# Patient Record
Sex: MALE | Employment: OTHER | URBAN - NONMETROPOLITAN AREA
[De-identification: names, ages, dates, MRNs, and addresses within clinical notes are randomized per-mention and may not be internally consistent; named-entity substitution may affect disease eponyms.]

---

## 2017-03-30 LAB
BASOPHILS ABSOLUTE: 0.1 10'3/UL (ref 0–0.2)
BASOPHILS RELATIVE PERCENT: 1.2 % (ref 0.2–2)
EOSINOPHILS ABSOLUTE: 0.4 10'3/UL (ref 0–0.4)
EOSINOPHILS RELATIVE PERCENT: 4.4 % (ref 0–6.3)
HCT VFR BLD CALC: 45.1 % (ref 39.8–49.4)
HEMOGLOBIN: 15.1 G/DL (ref 13.5–16.8)
LYMPHOCYTES ABSOLUTE: 1.8 10'3/UL (ref 0.4–3.6)
LYMPHOCYTES RELATIVE PERCENT: 20.3 % (ref 13.3–42.4)
MCH RBC QN AUTO: 29.8 PG (ref 27.9–33.7)
MCHC RBC AUTO-ENTMCNC: 33.6 G/DL (ref 33–35.2)
MCV RBC AUTO: 88.7 FL (ref 83.9–97)
MONOCYTES ABSOLUTE: 1 10'3/UL (ref 0.3–1)
MONOCYTES RELATIVE PERCENT: 11.4 % (ref 5.2–13.6)
NEUTROPHILS ABSOLUTE: 5.6 10'3/UL (ref 2.2–6.3)
NEUTROPHILS SEGMENTED: 62.7 % (ref 43.5–74.5)
PDW BLD-RTO: 13.6 % (ref 11.7–15.5)
PLATELET # BLD: 197 10'3/UL (ref 144–327)
PMV BLD AUTO: 8.4 FL (ref 7.2–10.4)
RBC # BLD: 5.08 10'6/UL (ref 4.24–5.64)
WBC: 8.9 10'3/UL (ref 4.1–10.2)

## 2017-04-20 LAB
BUN BLDV-MCNC: 23 MG/DL (ref 7–18)
CREAT SERPL-MCNC: 1.22 MG/DL (ref 0.7–1.3)

## 2017-04-27 LAB
BASOPHILS ABSOLUTE: 0.1 10'3/UL (ref 0–0.2)
BASOPHILS RELATIVE PERCENT: 1.2 % (ref 0.2–2)
EOSINOPHILS ABSOLUTE: 0.3 10'3/UL (ref 0–0.4)
EOSINOPHILS RELATIVE PERCENT: 4.8 % (ref 0–6.3)
HCT VFR BLD CALC: 43.6 % (ref 39.8–49.4)
HEMOGLOBIN: 14.8 G/DL (ref 13.5–16.8)
LYMPHOCYTES ABSOLUTE: 1.7 10'3/UL (ref 0.4–3.6)
LYMPHOCYTES RELATIVE PERCENT: 24.9 % (ref 13.3–42.4)
MCH RBC QN AUTO: 30.1 PG (ref 27.9–33.7)
MCHC RBC AUTO-ENTMCNC: 33.9 G/DL (ref 33–35.2)
MCV RBC AUTO: 88.9 FL (ref 83.9–97)
MONOCYTES ABSOLUTE: 0.7 10'3/UL (ref 0.3–1)
MONOCYTES RELATIVE PERCENT: 9.8 % (ref 5.2–13.6)
NEUTROPHILS ABSOLUTE: 4 10'3/UL (ref 2.2–6.3)
NEUTROPHILS SEGMENTED: 59.3 % (ref 43.5–74.5)
PDW BLD-RTO: 13.5 % (ref 11.7–15.5)
PLATELET # BLD: 190 10'3/UL (ref 144–327)
PMV BLD AUTO: 8.2 FL (ref 7.2–10.4)
RBC # BLD: 4.9 10'6/UL (ref 4.24–5.64)
WBC: 6.8 10'3/UL (ref 4.1–10.2)

## 2017-06-19 LAB
BASOPHILS ABSOLUTE: 0.1 10'3/UL (ref 0–0.2)
BASOPHILS RELATIVE PERCENT: 1 % (ref 0.2–2)
EOSINOPHILS ABSOLUTE: 0.3 10'3/UL (ref 0–0.4)
EOSINOPHILS RELATIVE PERCENT: 3.5 % (ref 0–6.3)
HCT VFR BLD CALC: 45.7 % (ref 39.8–49.4)
HEMOGLOBIN: 15.3 G/DL (ref 13.5–16.8)
LYMPHOCYTES ABSOLUTE: 2.1 10'3/UL (ref 0.4–3.6)
LYMPHOCYTES RELATIVE PERCENT: 22.8 % (ref 13.3–42.4)
MCH RBC QN AUTO: 30.2 PG (ref 27.9–33.7)
MCHC RBC AUTO-ENTMCNC: 33.5 G/DL (ref 33–35.2)
MCV RBC AUTO: 90.2 FL (ref 83.9–97)
MONOCYTES ABSOLUTE: 0.9 10'3/UL (ref 0.3–1)
MONOCYTES RELATIVE PERCENT: 10.2 % (ref 5.2–13.6)
NEUTROPHILS ABSOLUTE: 5.7 10'3/UL (ref 2.2–6.3)
NEUTROPHILS SEGMENTED: 62.5 % (ref 43.5–74.5)
PDW BLD-RTO: 13 % (ref 11.7–15.5)
PLATELET # BLD: 205 10'3/UL (ref 144–327)
PMV BLD AUTO: 8.8 FL (ref 7.2–10.4)
RBC # BLD: 5.06 10'6/UL (ref 4.24–5.64)
WBC: 9.1 10'3/UL (ref 4.1–10.2)

## 2017-07-27 LAB
ALBUMIN: 3.2 G/DL (ref 3.5–5)
ALP BLD-CCNC: 90 U/L (ref 45–117)
ALT SERPL-CCNC: 13 U/L (ref 12–78)
AST SERPL-CCNC: 26 U/L (ref 15–37)
BASOPHILS ABSOLUTE: 0.1 10'3/UL (ref 0–0.2)
BASOPHILS RELATIVE PERCENT: 0.9 % (ref 0.2–2)
BILIRUB SERPL-MCNC: 0.6 MG/DL (ref 0–1)
BUN BLDV-MCNC: 26 MG/DL (ref 7–18)
CALCIUM SERPL-MCNC: 8.6 MG/DL (ref 8.5–10.1)
CHLORIDE BLD-SCNC: 105 MMOL/L (ref 97–107)
CO2: 28 MMOL/L (ref 21–32)
CREAT SERPL-MCNC: 1.15 MG/DL (ref 0.7–1.3)
EOSINOPHILS ABSOLUTE: 0.3 10'3/UL (ref 0–0.4)
EOSINOPHILS RELATIVE PERCENT: 3.1 % (ref 0–6.3)
GFR CALCULATED: > 60
GLUCOSE: 86 MG/DL (ref 70–99)
HCT VFR BLD CALC: 44.7 % (ref 39.8–49.4)
HEMOGLOBIN: 15 G/DL (ref 13.5–16.8)
LYMPHOCYTES ABSOLUTE: 1.7 10'3/UL (ref 0.4–3.6)
LYMPHOCYTES RELATIVE PERCENT: 20.7 % (ref 13.3–42.4)
MCH RBC QN AUTO: 30 PG (ref 27.9–33.7)
MCHC RBC AUTO-ENTMCNC: 33.5 G/DL (ref 33–35.2)
MCV RBC AUTO: 89.4 FL (ref 83.9–97)
MONOCYTES ABSOLUTE: 0.9 10'3/UL (ref 0.3–1)
MONOCYTES RELATIVE PERCENT: 10.6 % (ref 5.2–13.6)
NEUTROPHILS ABSOLUTE: 5.3 10'3/UL (ref 2.2–6.3)
NEUTROPHILS SEGMENTED: 64.7 % (ref 43.5–74.5)
PDW BLD-RTO: 12.9 % (ref 11.7–15.5)
PLATELET # BLD: 182 10'3/UL (ref 144–327)
PMV BLD AUTO: 8.3 FL (ref 7.2–10.4)
POTASSIUM SERPL-SCNC: 4.3 MMOL/L (ref 3.5–5.1)
RBC # BLD: 5 10'6/UL (ref 4.24–5.64)
SODIUM BLD-SCNC: 139 MMOL/L (ref 136–145)
TOTAL PROTEIN: 6.8 G/DL (ref 6.4–8.2)
WBC: 8.1 10'3/UL (ref 4.1–10.2)

## 2019-06-11 LAB
BASOPHILS ABSOLUTE: 0.1 10'3/UL (ref 0–0.2)
BASOPHILS RELATIVE PERCENT: 0.7 % (ref 0.2–2)
EOSINOPHILS ABSOLUTE: 0.5 10'3/UL (ref 0–0.4)
EOSINOPHILS RELATIVE PERCENT: 6.5 % (ref 0–6.3)
HCT VFR BLD CALC: 46.1 % (ref 39.8–49.4)
HEMOGLOBIN: 14.9 G/DL (ref 13.5–16.8)
LYMPHOCYTES ABSOLUTE: 1.9 10'3/UL (ref 0.4–3.6)
LYMPHOCYTES RELATIVE PERCENT: 24.9 % (ref 13.3–42.4)
MCH RBC QN AUTO: 30.1 PG (ref 27.9–33.7)
MCHC RBC AUTO-ENTMCNC: 32.3 G/DL (ref 33–35.2)
MCV RBC AUTO: 92.9 FL (ref 83.9–97)
MONOCYTES ABSOLUTE: 0.8 10'3/UL (ref 0.3–1)
MONOCYTES RELATIVE PERCENT: 10.8 % (ref 5.2–13.6)
NEUTROPHILS ABSOLUTE: 4.4 10'3/UL (ref 2.2–6.3)
NEUTROPHILS SEGMENTED: 57.1 % (ref 43.5–74.5)
PDW BLD-RTO: 13.6 % (ref 11.7–15.5)
PLATELET # BLD: 180 10'3/UL (ref 144–327)
PMV BLD AUTO: 9.9 FL (ref 7.2–10.4)
RBC # BLD: 4.96 10'6/UL (ref 4.24–5.64)
WBC: 7.7 10'3/UL (ref 4.1–10.2)

## 2019-10-08 DIAGNOSIS — M81.0 OSTEOPOROSIS, UNSPECIFIED OSTEOPOROSIS TYPE, UNSPECIFIED PATHOLOGICAL FRACTURE PRESENCE: ICD-10-CM

## 2019-10-08 DIAGNOSIS — I15.9 SECONDARY HYPERTENSION: ICD-10-CM

## 2019-10-08 RX ORDER — GALANTAMINE HYDROBROMIDE 8 MG/1
8 CAPSULE, EXTENDED RELEASE ORAL
COMMUNITY
End: 2019-10-23 | Stop reason: DRUGHIGH

## 2019-10-08 RX ORDER — CITALOPRAM 10 MG/1
10 TABLET ORAL DAILY
COMMUNITY
End: 2019-10-23 | Stop reason: DRUGHIGH

## 2019-10-08 RX ORDER — TAMSULOSIN HYDROCHLORIDE 0.4 MG/1
0.4 CAPSULE ORAL DAILY
COMMUNITY

## 2019-10-08 RX ORDER — LISINOPRIL 10 MG/1
10 TABLET ORAL DAILY
COMMUNITY
End: 2019-10-23 | Stop reason: DRUGHIGH

## 2019-10-08 RX ORDER — OMEPRAZOLE 10 MG/1
10 CAPSULE, DELAYED RELEASE ORAL DAILY
COMMUNITY
End: 2019-10-23 | Stop reason: DRUGHIGH

## 2019-10-08 RX ORDER — RISPERIDONE 0.25 MG/1
0.25 TABLET, FILM COATED ORAL 2 TIMES DAILY
COMMUNITY
End: 2019-10-23 | Stop reason: DRUGHIGH

## 2019-10-08 RX ORDER — METOCLOPRAMIDE 10 MG/1
10 TABLET ORAL 4 TIMES DAILY
COMMUNITY
End: 2019-10-23 | Stop reason: DRUGHIGH

## 2019-10-08 SDOH — HEALTH STABILITY: MENTAL HEALTH: HOW OFTEN DO YOU HAVE A DRINK CONTAINING ALCOHOL?: NEVER

## 2019-10-23 ENCOUNTER — OFFICE VISIT (OUTPATIENT)
Dept: PAIN MANAGEMENT | Age: 74
End: 2019-10-23
Payer: MEDICARE

## 2019-10-23 VITALS
DIASTOLIC BLOOD PRESSURE: 88 MMHG | BODY MASS INDEX: 35.21 KG/M2 | RESPIRATION RATE: 20 BRPM | HEIGHT: 72 IN | SYSTOLIC BLOOD PRESSURE: 138 MMHG | WEIGHT: 260 LBS

## 2019-10-23 DIAGNOSIS — M54.16 LUMBAR RADICULOPATHY: Primary | ICD-10-CM

## 2019-10-23 DIAGNOSIS — I10 ESSENTIAL HYPERTENSION: ICD-10-CM

## 2019-10-23 DIAGNOSIS — M47.817 LUMBOSACRAL SPONDYLOSIS WITHOUT MYELOPATHY: ICD-10-CM

## 2019-10-23 DIAGNOSIS — M19.90 ARTHRITIS: ICD-10-CM

## 2019-10-23 PROCEDURE — 3017F COLORECTAL CA SCREEN DOC REV: CPT | Performed by: PHYSICAL MEDICINE & REHABILITATION

## 2019-10-23 PROCEDURE — 1036F TOBACCO NON-USER: CPT | Performed by: PHYSICAL MEDICINE & REHABILITATION

## 2019-10-23 PROCEDURE — G8484 FLU IMMUNIZE NO ADMIN: HCPCS | Performed by: PHYSICAL MEDICINE & REHABILITATION

## 2019-10-23 PROCEDURE — 1123F ACP DISCUSS/DSCN MKR DOCD: CPT | Performed by: PHYSICAL MEDICINE & REHABILITATION

## 2019-10-23 PROCEDURE — G8427 DOCREV CUR MEDS BY ELIG CLIN: HCPCS | Performed by: PHYSICAL MEDICINE & REHABILITATION

## 2019-10-23 PROCEDURE — 4040F PNEUMOC VAC/ADMIN/RCVD: CPT | Performed by: PHYSICAL MEDICINE & REHABILITATION

## 2019-10-23 PROCEDURE — G8417 CALC BMI ABV UP PARAM F/U: HCPCS | Performed by: PHYSICAL MEDICINE & REHABILITATION

## 2019-10-23 PROCEDURE — 99204 OFFICE O/P NEW MOD 45 MIN: CPT | Performed by: PHYSICAL MEDICINE & REHABILITATION

## 2019-10-23 RX ORDER — GALANTAMINE HYDROBROMIDE 16 MG/1
16 CAPSULE, EXTENDED RELEASE ORAL DAILY
COMMUNITY
Start: 2019-01-04 | End: 2020-09-30 | Stop reason: DRUGHIGH

## 2019-10-23 RX ORDER — OXYBUTYNIN CHLORIDE 10 MG/1
10 TABLET, EXTENDED RELEASE ORAL DAILY
COMMUNITY
Start: 2019-10-02 | End: 2020-09-30 | Stop reason: DRUGHIGH

## 2019-10-23 RX ORDER — RISPERIDONE 1 MG/1
TABLET, FILM COATED ORAL 2 TIMES DAILY
COMMUNITY
Start: 2019-01-04

## 2019-10-23 RX ORDER — CITALOPRAM 20 MG/1
20 TABLET ORAL DAILY
COMMUNITY
Start: 2019-01-04

## 2019-10-23 RX ORDER — CLOZAPINE 25 MG/1
TABLET ORAL
COMMUNITY
Start: 2019-01-31

## 2019-10-23 RX ORDER — OMEPRAZOLE 40 MG/1
1 CAPSULE, DELAYED RELEASE ORAL DAILY
COMMUNITY

## 2019-10-23 RX ORDER — DOCUSATE SODIUM 100 MG/1
CAPSULE, LIQUID FILLED ORAL
COMMUNITY

## 2019-10-23 ASSESSMENT — ENCOUNTER SYMPTOMS
APNEA: 0
BACK PAIN: 0
COLOR CHANGE: 0

## 2019-10-24 ENCOUNTER — TELEPHONE (OUTPATIENT)
Dept: PAIN MANAGEMENT | Age: 74
End: 2019-10-24

## 2019-10-29 LAB
ALBUMIN: 3.5 G/DL (ref 3.2–4.6)
ALP BLD-CCNC: 96 U/L (ref 40–150)
ALT SERPL-CCNC: 9 U/L (ref 0–55)
ANION GAP SERPL CALCULATED.3IONS-SCNC: 12 MEQ/L (ref 5–13)
AST SERPL-CCNC: 21 U/L (ref 5–34)
BASOPHILS ABSOLUTE: 0 10'3/UL (ref 0–0.2)
BASOPHILS RELATIVE PERCENT: 0.7 % (ref 0.2–2)
BILIRUB SERPL-MCNC: 0.5 MG/DL (ref 0–1)
BUN BLDV-MCNC: 21 MG/DL (ref 7–18)
CALCIUM SERPL-MCNC: 8.7 MG/DL (ref 8.8–10)
CHLORIDE BLD-SCNC: 108 MMOL/L (ref 97–107)
CO2: 22 MMOL/L (ref 23–31)
CREAT SERPL-MCNC: 1.07 MG/DL (ref 0.72–1.25)
EOSINOPHILS ABSOLUTE: 0.5 10'3/UL (ref 0–0.4)
EOSINOPHILS RELATIVE PERCENT: 7 % (ref 0–6.3)
GFR CALCULATED: > 60
GLUCOSE: 97 MG/DL (ref 70–99)
HCT VFR BLD CALC: 43.7 % (ref 39.8–49.4)
HEMOGLOBIN: 14.9 G/DL (ref 13.5–16.8)
LYMPHOCYTES ABSOLUTE: 1.8 10'3/UL (ref 0.4–3.6)
LYMPHOCYTES RELATIVE PERCENT: 25.6 % (ref 13.3–42.4)
MCH RBC QN AUTO: 31 PG (ref 27.9–33.7)
MCHC RBC AUTO-ENTMCNC: 34.1 G/DL (ref 33–35.2)
MCV RBC AUTO: 91.1 FL (ref 83.9–97)
MONOCYTES ABSOLUTE: 0.8 10'3/UL (ref 0.3–1)
MONOCYTES RELATIVE PERCENT: 11.3 % (ref 5.2–13.6)
NEUTROPHILS ABSOLUTE: 3.8 10'3/UL (ref 2.2–6.3)
NEUTROPHILS SEGMENTED: 55.4 % (ref 43.5–74.5)
PDW BLD-RTO: 13.9 % (ref 11.7–15.5)
PLATELET # BLD: 169 10'3/UL (ref 144–327)
PMV BLD AUTO: 9.4 FL (ref 7.2–10.4)
POTASSIUM SERPL-SCNC: 4.4 MMOL/L (ref 3.5–5.1)
RBC # BLD: 4.8 10'6/UL (ref 4.24–5.64)
SODIUM BLD-SCNC: 142 MMOL/L (ref 136–145)
TOTAL PROTEIN: 6.6 G/DL (ref 6.4–8.2)
WBC: 6.9 10'3/UL (ref 4.1–10.2)

## 2019-11-06 ENCOUNTER — PROCEDURE VISIT (OUTPATIENT)
Dept: PAIN MANAGEMENT | Age: 74
End: 2019-11-06
Payer: MEDICARE

## 2019-11-06 DIAGNOSIS — M47.816 LUMBAR FACET ARTHROPATHY: ICD-10-CM

## 2019-11-06 DIAGNOSIS — M47.816 LUMBAR FACET JOINT SYNDROME: ICD-10-CM

## 2019-11-06 PROCEDURE — 64493 INJ PARAVERT F JNT L/S 1 LEV: CPT | Performed by: PHYSICAL MEDICINE & REHABILITATION

## 2019-11-06 PROCEDURE — 64494 INJ PARAVERT F JNT L/S 2 LEV: CPT | Performed by: PHYSICAL MEDICINE & REHABILITATION

## 2019-11-06 PROCEDURE — 64495 INJ PARAVERT F JNT L/S 3 LEV: CPT | Performed by: PHYSICAL MEDICINE & REHABILITATION

## 2019-11-07 ENCOUNTER — TELEPHONE (OUTPATIENT)
Dept: PAIN MANAGEMENT | Age: 74
End: 2019-11-07

## 2019-11-20 ENCOUNTER — OFFICE VISIT (OUTPATIENT)
Dept: PAIN MANAGEMENT | Age: 74
End: 2019-11-20
Payer: MEDICARE

## 2019-11-20 VITALS
HEART RATE: 72 BPM | SYSTOLIC BLOOD PRESSURE: 130 MMHG | WEIGHT: 270 LBS | HEIGHT: 72 IN | DIASTOLIC BLOOD PRESSURE: 70 MMHG | BODY MASS INDEX: 36.57 KG/M2 | RESPIRATION RATE: 16 BRPM

## 2019-11-20 DIAGNOSIS — G20 PARKINSON'S DISEASE (HCC): ICD-10-CM

## 2019-11-20 DIAGNOSIS — F32.0 CURRENT MILD EPISODE OF MAJOR DEPRESSIVE DISORDER WITHOUT PRIOR EPISODE (HCC): ICD-10-CM

## 2019-11-20 DIAGNOSIS — Z96.641 HISTORY OF RIGHT HIP REPLACEMENT: ICD-10-CM

## 2019-11-20 DIAGNOSIS — M47.816 LUMBAR FACET JOINT SYNDROME: Primary | ICD-10-CM

## 2019-11-20 DIAGNOSIS — M54.16 LUMBAR RADICULOPATHY: ICD-10-CM

## 2019-11-20 PROCEDURE — 3017F COLORECTAL CA SCREEN DOC REV: CPT | Performed by: PHYSICAL MEDICINE & REHABILITATION

## 2019-11-20 PROCEDURE — G8427 DOCREV CUR MEDS BY ELIG CLIN: HCPCS | Performed by: PHYSICAL MEDICINE & REHABILITATION

## 2019-11-20 PROCEDURE — 4040F PNEUMOC VAC/ADMIN/RCVD: CPT | Performed by: PHYSICAL MEDICINE & REHABILITATION

## 2019-11-20 PROCEDURE — 1123F ACP DISCUSS/DSCN MKR DOCD: CPT | Performed by: PHYSICAL MEDICINE & REHABILITATION

## 2019-11-20 PROCEDURE — G8484 FLU IMMUNIZE NO ADMIN: HCPCS | Performed by: PHYSICAL MEDICINE & REHABILITATION

## 2019-11-20 PROCEDURE — 1036F TOBACCO NON-USER: CPT | Performed by: PHYSICAL MEDICINE & REHABILITATION

## 2019-11-20 PROCEDURE — 99214 OFFICE O/P EST MOD 30 MIN: CPT | Performed by: PHYSICAL MEDICINE & REHABILITATION

## 2019-11-20 PROCEDURE — G8417 CALC BMI ABV UP PARAM F/U: HCPCS | Performed by: PHYSICAL MEDICINE & REHABILITATION

## 2019-11-20 ASSESSMENT — ENCOUNTER SYMPTOMS
APNEA: 0
COLOR CHANGE: 0
BACK PAIN: 0

## 2019-11-21 LAB
ALBUMIN: 3.6 G/DL (ref 3.2–4.6)
ALP BLD-CCNC: 96 U/L (ref 40–150)
ALT SERPL-CCNC: 12 U/L (ref 0–55)
ANION GAP SERPL CALCULATED.3IONS-SCNC: 12 MEQ/L (ref 5–13)
AST SERPL-CCNC: 19 U/L (ref 5–34)
BASOPHILS ABSOLUTE: 0.1 10'3/UL (ref 0–0.2)
BASOPHILS RELATIVE PERCENT: 1 % (ref 0.2–2)
BILIRUB SERPL-MCNC: 0.4 MG/DL (ref 0–1)
BUN BLDV-MCNC: 22 MG/DL (ref 7–18)
CALCIUM SERPL-MCNC: 8.8 MG/DL (ref 8.8–10)
CHLORIDE BLD-SCNC: 108 MMOL/L (ref 98–107)
CO2: 22 MMOL/L (ref 23–31)
CREAT SERPL-MCNC: 1.1 MG/DL (ref 0.72–1.25)
EOSINOPHILS ABSOLUTE: 0.5 10'3/UL (ref 0–0.4)
EOSINOPHILS RELATIVE PERCENT: 6.8 % (ref 0–6.3)
GFR CALCULATED: > 60
GLUCOSE: 106 MG/DL (ref 70–99)
HCT VFR BLD CALC: 43 % (ref 39.8–49.4)
HEMOGLOBIN: 14.8 G/DL (ref 13.5–16.8)
LYMPHOCYTES ABSOLUTE: 1.6 10'3/UL (ref 0.4–3.6)
LYMPHOCYTES RELATIVE PERCENT: 24 % (ref 13.3–42.4)
MCH RBC QN AUTO: 30.9 PG (ref 27.9–33.7)
MCHC RBC AUTO-ENTMCNC: 34.4 G/DL (ref 33–35.2)
MCV RBC AUTO: 89.9 FL (ref 83.9–97)
MONOCYTES ABSOLUTE: 0.6 10'3/UL (ref 0.3–1)
MONOCYTES RELATIVE PERCENT: 9.3 % (ref 5.2–13.6)
NEUTROPHILS ABSOLUTE: 4 10'3/UL (ref 2.2–6.3)
NEUTROPHILS SEGMENTED: 58.9 % (ref 43.5–74.5)
PDW BLD-RTO: 13.5 % (ref 11.7–15.5)
PLATELET # BLD: 172 10'3/UL (ref 144–327)
PMV BLD AUTO: 8.4 FL (ref 7.2–10.4)
POTASSIUM SERPL-SCNC: 4.5 MMOL/L (ref 3.5–4.5)
RBC # BLD: 4.78 10'6/UL (ref 4.24–5.64)
SODIUM BLD-SCNC: 142 MMOL/L (ref 136–145)
TOTAL PROTEIN: 6.9 G/DL (ref 6.4–8.2)
WBC: 6.8 10'3/UL (ref 4.1–10.2)

## 2019-11-27 ENCOUNTER — PROCEDURE VISIT (OUTPATIENT)
Dept: PAIN MANAGEMENT | Age: 74
End: 2019-11-27
Payer: MEDICARE

## 2019-11-27 DIAGNOSIS — M47.817 LUMBOSACRAL SPONDYLOSIS WITHOUT MYELOPATHY: ICD-10-CM

## 2019-11-27 DIAGNOSIS — M47.816 LUMBAR FACET JOINT SYNDROME: ICD-10-CM

## 2019-11-27 PROCEDURE — 64493 INJ PARAVERT F JNT L/S 1 LEV: CPT | Performed by: PHYSICAL MEDICINE & REHABILITATION

## 2019-11-27 PROCEDURE — 64495 INJ PARAVERT F JNT L/S 3 LEV: CPT | Performed by: PHYSICAL MEDICINE & REHABILITATION

## 2019-11-27 PROCEDURE — 64494 INJ PARAVERT F JNT L/S 2 LEV: CPT | Performed by: PHYSICAL MEDICINE & REHABILITATION

## 2019-12-04 ENCOUNTER — TELEPHONE (OUTPATIENT)
Dept: PAIN MANAGEMENT | Age: 74
End: 2019-12-04

## 2020-01-06 ENCOUNTER — TELEPHONE (OUTPATIENT)
Dept: PAIN MANAGEMENT | Age: 75
End: 2020-01-06

## 2020-01-06 NOTE — TELEPHONE ENCOUNTER
Please call pt to inform if he needs to hold blood thinners or not for procedure on Wednesday    [phone]

## 2020-01-07 LAB
ALBUMIN: 3.5 G/DL (ref 3.2–4.6)
ALP BLD-CCNC: 95 U/L (ref 40–150)
ALT SERPL-CCNC: 14 U/L (ref 0–55)
ANION GAP SERPL CALCULATED.3IONS-SCNC: 11 MEQ/L (ref 5–13)
AST SERPL-CCNC: 23 U/L (ref 5–34)
BASOPHILS ABSOLUTE: 0.1 10'3/UL (ref 0–0.2)
BASOPHILS RELATIVE PERCENT: 0.7 % (ref 0.2–2)
BILIRUB SERPL-MCNC: 0.5 MG/DL (ref 0–1)
BUN BLDV-MCNC: 24 MG/DL (ref 7–18)
CALCIUM SERPL-MCNC: 8.9 MG/DL (ref 8.8–10)
CHLORIDE BLD-SCNC: 108 MMOL/L (ref 98–107)
CO2: 22 MMOL/L (ref 23–31)
CREAT SERPL-MCNC: 1.27 MG/DL (ref 0.72–1.25)
EOSINOPHILS ABSOLUTE: 0.5 10'3/UL (ref 0–0.4)
EOSINOPHILS RELATIVE PERCENT: 6.7 % (ref 0–6.3)
GFR CALCULATED: 59
GLUCOSE: 94 MG/DL (ref 70–99)
HCT VFR BLD CALC: 44.1 % (ref 39.8–49.4)
HEMOGLOBIN: 15.2 G/DL (ref 13.5–16.8)
LYMPHOCYTES ABSOLUTE: 2 10'3/UL (ref 0.4–3.6)
LYMPHOCYTES RELATIVE PERCENT: 26.2 % (ref 13.3–42.4)
MCH RBC QN AUTO: 31.1 PG (ref 27.9–33.7)
MCHC RBC AUTO-ENTMCNC: 34.6 G/DL (ref 33–35.2)
MCV RBC AUTO: 90.1 FL (ref 83.9–97)
MONOCYTES ABSOLUTE: 0.9 10'3/UL (ref 0.3–1)
MONOCYTES RELATIVE PERCENT: 11.9 % (ref 5.2–13.6)
NEUTROPHILS ABSOLUTE: 4.1 10'3/UL (ref 2.2–6.3)
NEUTROPHILS SEGMENTED: 54.5 % (ref 43.5–74.5)
PDW BLD-RTO: 13.8 % (ref 11.7–15.5)
PLATELET # BLD: 179 10'3/UL (ref 144–327)
PMV BLD AUTO: 9.3 FL (ref 7.2–10.4)
POTASSIUM SERPL-SCNC: 4.7 MMOL/L (ref 3.5–4.5)
RBC # BLD: 4.89 10'6/UL (ref 4.24–5.64)
SODIUM BLD-SCNC: 141 MMOL/L (ref 136–145)
TOTAL PROTEIN: 6.9 G/DL (ref 6.4–8.2)
WBC: 7.5 10'3/UL (ref 4.1–10.2)

## 2020-01-08 ENCOUNTER — PROCEDURE VISIT (OUTPATIENT)
Dept: PAIN MANAGEMENT | Age: 75
End: 2020-01-08
Payer: MEDICARE

## 2020-01-08 PROCEDURE — 64636 DESTROY L/S FACET JNT ADDL: CPT | Performed by: PHYSICAL MEDICINE & REHABILITATION

## 2020-01-08 PROCEDURE — 64635 DESTROY LUMB/SAC FACET JNT: CPT | Performed by: PHYSICAL MEDICINE & REHABILITATION

## 2020-01-08 NOTE — PROGRESS NOTES
site prior to lesioning. 6 lesions were performed at each level  BY SAGITTAL APPROACH at a temperature of 80 degrees Celsius for a duration of 90 seconds, as described by JAELYN. Impedance was measured and ranged from 204-344 ohms. Then, 0.5mL of  0.5% bupivacaine was instilled at each site after lesioning. The patient tolerated the procedure(s) well and without complications and was noted to be in stable condition prior to discharge from procedure center with discharge instructions. EBL: no blood loss    SPECIMEN: none    IMPRESSION:    1. Bilateral L2, L3, L4, L5 radiofrequency neurotomies performed uneventfully. RECOMMENDATIONS:  1. Follow up in the [address] in 2 to 4 weeks  2. Continue Neurontin and opioid analgesia, as per protocol. 3.    Complete and return the \"Post-Procedure Pain and Activity Diary. 4.    Contact the [address] for symptom exacerbation, fever or unusual symptoms. 5.    Follow post-procedure care according to verbal and written instructions. POST-PROCEDURE LUMBAR SPINE FLUOROSCOPIC IMAGE INTERPRETATION:    EXAMINATION:  AP, lateral and oblique views of the lumbar spine    FLUORO TIME: 49 seconds    DISCUSSION:  Spot views of the spine reveal normal alignment and segmentation. Spinal needles are positioned at the base of the SAP and across the pedicle on AP and lateral views. #3:  across the ventral and middle third of the articular pillar. Visualized spine reveals See radiology report. Soft tissues reveal no abnormalities. IMPRESSION: Satisfactory probe placement for RF (radiofrequency) lesioning of the Bilateral L2, L3, L4, L5 medial branches.     Electronically signed by Jovita Willoughby MD on 1/8/2020 at 1:02 PM

## 2020-01-14 LAB
ALBUMIN: 3.4 G/DL (ref 3.2–4.6)
ALP BLD-CCNC: 97 U/L (ref 40–150)
ALT SERPL-CCNC: 27 U/L (ref 0–55)
ANION GAP SERPL CALCULATED.3IONS-SCNC: 9 MEQ/L (ref 5–13)
AST SERPL-CCNC: 24 U/L (ref 5–34)
BILIRUB SERPL-MCNC: 0.4 MG/DL (ref 0–1)
BUN BLDV-MCNC: 26 MG/DL (ref 7–18)
CALCIUM SERPL-MCNC: 8.4 MG/DL (ref 8.8–10)
CHLORIDE BLD-SCNC: 108 MMOL/L (ref 98–107)
CO2: 22 MMOL/L (ref 23–31)
CREAT SERPL-MCNC: 1.2 MG/DL (ref 0.72–1.25)
GFR CALCULATED: > 60
GLUCOSE: 112 MG/DL (ref 70–99)
POTASSIUM SERPL-SCNC: 4.4 MMOL/L (ref 3.5–5.1)
SODIUM BLD-SCNC: 139 MMOL/L (ref 136–145)
TOTAL PROTEIN: 6.6 G/DL (ref 6.4–8.2)

## 2020-01-17 NOTE — TELEPHONE ENCOUNTER
This is an old note. Called and discussed with the patients wife. He had an RFA previously and stated that they did receive a call back regarding the question around the time of the injection.

## 2020-01-29 ENCOUNTER — OFFICE VISIT (OUTPATIENT)
Dept: PAIN MANAGEMENT | Age: 75
End: 2020-01-29
Payer: MEDICARE

## 2020-01-29 VITALS
SYSTOLIC BLOOD PRESSURE: 120 MMHG | HEIGHT: 72 IN | DIASTOLIC BLOOD PRESSURE: 80 MMHG | BODY MASS INDEX: 36.57 KG/M2 | WEIGHT: 270 LBS

## 2020-01-29 PROCEDURE — G8427 DOCREV CUR MEDS BY ELIG CLIN: HCPCS | Performed by: PHYSICAL MEDICINE & REHABILITATION

## 2020-01-29 PROCEDURE — G8417 CALC BMI ABV UP PARAM F/U: HCPCS | Performed by: PHYSICAL MEDICINE & REHABILITATION

## 2020-01-29 PROCEDURE — 1123F ACP DISCUSS/DSCN MKR DOCD: CPT | Performed by: PHYSICAL MEDICINE & REHABILITATION

## 2020-01-29 PROCEDURE — G8484 FLU IMMUNIZE NO ADMIN: HCPCS | Performed by: PHYSICAL MEDICINE & REHABILITATION

## 2020-01-29 PROCEDURE — 3017F COLORECTAL CA SCREEN DOC REV: CPT | Performed by: PHYSICAL MEDICINE & REHABILITATION

## 2020-01-29 PROCEDURE — 4040F PNEUMOC VAC/ADMIN/RCVD: CPT | Performed by: PHYSICAL MEDICINE & REHABILITATION

## 2020-01-29 PROCEDURE — 1036F TOBACCO NON-USER: CPT | Performed by: PHYSICAL MEDICINE & REHABILITATION

## 2020-01-29 PROCEDURE — 99214 OFFICE O/P EST MOD 30 MIN: CPT | Performed by: PHYSICAL MEDICINE & REHABILITATION

## 2020-01-29 ASSESSMENT — ENCOUNTER SYMPTOMS
ALLERGIC/IMMUNOLOGIC NEGATIVE: 1
BACK PAIN: 1
RESPIRATORY NEGATIVE: 1
CONSTIPATION: 0
NAUSEA: 0
EYES NEGATIVE: 1

## 2020-01-29 NOTE — PROGRESS NOTES
PAIN MANAGEMENT FOLLOW-UP NOTE  1/29/20    CHIEF COMPLAINT: This is a76 y.o. male patientwho [facility] the [facility] with a history of Back Pain      PAIN HPI:Toi Hernandez returns today for  reevaluation. Since the visit, the patient reports that the pain is not changed. No improvement since Lumbar 2,3,4,,5 RFA 2 weeks ago   Notes pain in upper lumbar     Location: Back  Location Modifier: entire back  Severity of Pain: 8  Duration of Pain: Intermittent  Frequency of Pain: Intermittent  Aggravating Factors: bending backwards  Limiting Behavior: Standing   Relieving Factors: relaxation        Previous pain medication trials have included:          Mental health:    Patient feels - secondary to their current pain problems as described above. H/O depression and anxiety: No   Patient is not seeing psychologist orpsychiatrist   Abuse history? No    Employed? No    ANALGESIA:   Are your Current Pain medication (s) helping to decrease pain? No.   Current Pain score: Pain Score:   6    ADVERSE AFFECTS:   Medication Side Effects: No.    ACTIVITY:  Are you able to be more active with your pain medications? No      ABERRANT BEHAVIORS SINCE LAST VISIT? No    Review of Systems   Constitutional: Positive for fatigue. HENT: Negative. Eyes: Negative. Respiratory: Negative. Cardiovascular: Negative. Gastrointestinal: Negative for constipation and nausea. Endocrine: Negative. Genitourinary: Negative for difficulty urinating. Musculoskeletal: Positive for arthralgias, back pain and myalgias. Skin: Negative. Allergic/Immunologic: Negative. Neurological: Positive for weakness and numbness. Hematological: Negative. Psychiatric/Behavioral: Positive for sleep disturbance. All other systems reviewed and are negative.        No Known Allergies    Outpatient Medications Prior to Visit   Medication Sig Dispense Refill    rivaroxaban (XARELTO) 20 MG TABS tablet Take 20 mg by mouth      carbidopa-levodopa (SINEMET)  MG per tablet Take 2 tablets by mouth 3 times daily      citalopram (CELEXA) 20 MG tablet Take 20 mg by mouth daily      galantamine (RAZADYNE ER) 16 MG extended release capsule Take 16 mg by mouth daily      omeprazole (PRILOSEC) 40 MG delayed release capsule Take 1 capsule by mouth daily      risperiDONE (RISPERDAL) 1 MG tablet Take 1 mg by mouth 2 times daily      Acetaminophen (TYLENOL) 325 MG CAPS Take by mouth as needed      betamethasone valerate (VALISONE) 0.1 % cream betamethasone valerate 0.1 % topical cream   APPLY A THIN LAYER TO THE AFFECTED AREA on legs twice DAILY      cloZAPine (CLOZARIL) 25 MG tablet Take 1 tablet by mouth 3 times daily      docusate sodium (COLACE) 100 MG capsule Colace 100 mg capsule   Take 1 capsule twice a day by oral route.  oxybutynin (DITROPAN-XL) 10 MG extended release tablet Take 10 mg by mouth daily      tamsulosin (FLOMAX) 0.4 MG capsule Take 0.4 mg by mouth daily      apixaban (ELIQUIS) 2.5 MG TABS tablet Take by mouth 2 times daily       No facility-administered medications prior to visit. Past Medical History:   Diagnosis Date    Arthritis     DVT (deep venous thrombosis) ([facility] [address] 2015    hx of DVT in right leg and lung    Fall     falls due to parkensin    HTN (hypertension)     Osteoporosis     Parkinson's disease ([facility] [address]        Past Surgical History:   Procedure Laterality Date    BACK SURGERY      FACET JOINT INJECTION Bilateral 11/06/2019    Bl3-4 4-5 5-S1 facet joint injections with arthrography.  NERVE BLOCK Bilateral 12/11/2019    bilateral L2, L3, L4, L5 medial branch blocks.     NERVE BLOCK Bilateral 11/27/2019    bilateral L2, L3, L4, L5 medial branch blocks    OTHER SURGICAL HISTORY      insert balloon opening for stomach     OTHER SURGICAL HISTORY Bilateral 01/08/2020    Bilateral L2, L3, L4, L5 radiofrequency neurotomies    STOMACH SURGERY      TOTAL HIP ARTHROPLASTY Right     fell broke hip    TOTAL KNEE ARTHROPLASTY Right      Family History   Problem Relation Age of Onset    Arthritis Father      Social History     Socioeconomic History    Marital status:      Spouse name: None    Number of children: None    Years of education: None    Highest education level: None   Occupational History    None   Social Needs    Financial resource strain: None    Food insecurity:     Worry: None     Inability: None    Transportation needs:     Medical: None     Non-medical: None   Tobacco Use    Smoking status: Never Smoker    Smokeless tobacco: Never Used   Substance and Sexual Activity    Alcohol use: Never     Frequency: Never    Drug use: Never    Sexual activity: None   Lifestyle    Physical activity:     Days per week: None     Minutes per session: None    Stress: None   Relationships    Social connections:     Talks on phone: None     Gets together: None     Attends Jew service: None     Active member of club or organization: None     Attends meetings of clubs or organizations: None     Relationship status: None    Intimate partner violence:     Fear of current or ex partner: None     Emotionally abused: None     Physically abused: None     Forced sexual activity: None   Other Topics Concern    None   Social History Narrative    None         Family and Social Historyreviewed in the electronic medical record. Imaging:Reviewed available imaging in our system with the patient. No results found. Objective:     Physical Exam:  Vitals:    01/29/20 1018   BP: 120/80   Site: Right Upper Arm   Position: Sitting   Cuff Size: Medium Adult   Weight: 270 lb (122.5 kg)   Height: 6' (1.829 m)     Pain Score:   6    Physical Exam  Constitutional:       Appearance: He is well-developed. HENT:      Head: Normocephalic and atraumatic. Cardiovascular:      Pulses: Normal pulses. Comments: Warm extremities. Normal capillary refill.   Pulmonary:      Effort: Pulmonary effort is

## 2020-02-05 ENCOUNTER — PROCEDURE VISIT (OUTPATIENT)
Dept: PAIN MANAGEMENT | Age: 75
End: 2020-02-05
Payer: MEDICARE

## 2020-02-05 PROCEDURE — 64494 INJ PARAVERT F JNT L/S 2 LEV: CPT | Performed by: PHYSICAL MEDICINE & REHABILITATION

## 2020-02-05 PROCEDURE — 64493 INJ PARAVERT F JNT L/S 1 LEV: CPT | Performed by: PHYSICAL MEDICINE & REHABILITATION

## 2020-02-05 NOTE — PROGRESS NOTES
ZYGAPOPHYSEAL JOINT INJECTION    2/5/20    Surgeon: Jaya Talbot MD    Pre-operative Diagnosis:   Patient Active Problem List   Diagnosis Code    HTN (hypertension) I10    Osteoporosis M81.0    Parkinson's disease ([facility] [address] G20    Arthritis M19.90   lumbar facet syndrome  Lumbar panniculitis    Post-operative Diagnosis: Same    INDICATION:  Previous treatment and examination findings are noted in the H&P and previous clinic notes.  Bilateral before L1-2 2-3  facet joint injections have been requested for diagnostic and therapeutic reasons. Conservative treatment was ineffective i.e.: ice, NSAIDS, rest, narcotic medication, chiropractic care, physical therapy and message therapy. Patient is unable to perform the following ADL's: ambulating, grooming, sitting and standing                        Last Plain films: 2019      EXAMINATION:   B L1-2 2-3  facet joint injections with arthrography. CONSENT:  Written consent was obtained from the patient on preprinted consent form after explaining the procedure, indications, potential complications and outcomes.  Alternative treatments were also discussed. DISCUSSION:  The patient was sterilely prepped and draped in the usual fashion in the prone position.  Time out\" was verified for correct patient, side, level and procedure. SEDATION:  No conscious sedation was performed during the procedure.  The patient remained awake and conversed throughout the procedure.  The patient underwent pulse oximetry and blood pressure monitoring independently by a trained observer, as well as by a physician. PROCEDURE[de-identified]  Under image-intensifier control, a standard technique was employed, a 22 gauge needle x 5 inch spinal needle was guided successfully to cannulate the B L1-2 2-3 zygapophyseal joint via a posterior lateral approach. Instillation of .5 mL of Omnipaque 240 contrast medium demonstrated contiguous flow into the joint and the joint capsule.  No vascular spread was noted. Digital subtraction was not employed to evaluate for vascular spread.] The patient was monitored for any untoward reaction to contrast medium before proceeding with procedure #2. Dexamethasone (Decadron 10 mg/mL) was injected into each joint. A total of 4 joints were injected. PROVOCATION: The patient did not report pain reproduction in a concordant distribution upon capsular distention of the lumbar facet joints from the contrast injection. ARTHROGRAPHY: The lumbar facet arthrogram revealed contrast in the joint space in the superior and inferior recesses; no contrast extravasation. The patient tolerated the procedure well and without complications and was noted to be in stable condition prior to discharge from the procedure center with discharge instructions. IMPRESSIONS:  1. lumbar facet arthrogram is abnormal: . .  2. lumbar facet joint injection negative for provocation of the patient's pain symptoms. EBL: no blood loss    SPECIMEN: none    RECOMMENDATIONS:  1. Complete and return Post-Procedure Pain and Activity Diary.   2. Contact the [address] for symptom exacerbation, fever or unusual symptoms. 3. Post-procedure care according to verbal and written discharge instructions  4. Continue with PT as per MD directions. 5. Consider diagnositc MBB if there is > 80% pain relief and improved activity scores. SPINE FLUOROSCOPIC IMAGE INTERPRETATION    EXAMINATION:  AP, Lateral, and Oblique views. FLUORO TIME: 34 seconds    DISCUSSION:  Spot views of the spine reveal normal alignment and segmentation. Spinal needles are positioned in the lumbar facet joint. Contrast outlines the joint space and reveals narrowing. Visualized spine reveals disc space narrow. Soft tissues reveal no abnormalities.     IMPRESSION: lumbar facet arthrogram shows satisfactory needle placement and contrast dispersal.    Electronically signed by Drea James MD on 2/5/2020 at 12:50 PM.

## 2020-03-25 ENCOUNTER — OFFICE VISIT (OUTPATIENT)
Dept: PAIN MANAGEMENT | Age: 75
End: 2020-03-25
Payer: MEDICARE

## 2020-03-25 VITALS
RESPIRATION RATE: 16 BRPM | WEIGHT: 270 LBS | SYSTOLIC BLOOD PRESSURE: 132 MMHG | HEIGHT: 72 IN | BODY MASS INDEX: 36.57 KG/M2 | DIASTOLIC BLOOD PRESSURE: 72 MMHG

## 2020-03-25 PROCEDURE — G8428 CUR MEDS NOT DOCUMENT: HCPCS | Performed by: PHYSICAL MEDICINE & REHABILITATION

## 2020-03-25 PROCEDURE — 1036F TOBACCO NON-USER: CPT | Performed by: PHYSICAL MEDICINE & REHABILITATION

## 2020-03-25 PROCEDURE — 99214 OFFICE O/P EST MOD 30 MIN: CPT | Performed by: PHYSICAL MEDICINE & REHABILITATION

## 2020-03-25 PROCEDURE — 4040F PNEUMOC VAC/ADMIN/RCVD: CPT | Performed by: PHYSICAL MEDICINE & REHABILITATION

## 2020-03-25 PROCEDURE — 3017F COLORECTAL CA SCREEN DOC REV: CPT | Performed by: PHYSICAL MEDICINE & REHABILITATION

## 2020-03-25 PROCEDURE — G8484 FLU IMMUNIZE NO ADMIN: HCPCS | Performed by: PHYSICAL MEDICINE & REHABILITATION

## 2020-03-25 PROCEDURE — G8417 CALC BMI ABV UP PARAM F/U: HCPCS | Performed by: PHYSICAL MEDICINE & REHABILITATION

## 2020-03-25 PROCEDURE — 1123F ACP DISCUSS/DSCN MKR DOCD: CPT | Performed by: PHYSICAL MEDICINE & REHABILITATION

## 2020-03-25 ASSESSMENT — ENCOUNTER SYMPTOMS
ALLERGIC/IMMUNOLOGIC NEGATIVE: 1
RESPIRATORY NEGATIVE: 1
BACK PAIN: 1
NAUSEA: 0
CONSTIPATION: 0
EYES NEGATIVE: 1

## 2020-03-25 NOTE — PROGRESS NOTES
PAIN MANAGEMENT FOLLOW-UP NOTE  3/25/20    CHIEF COMPLAINT: This is a74 y.o. male patientwho returns to the [facility] Management Clinic with a history of Follow Up After Procedure (injections 2/5/20 helped but did not last); Back Pain (lower, down to bottom of knees); and Leg Pain (lorenzo numbness )      PAIN HPI:Toi Peralta returns today for  reevaluation. Since the visit, the patient reports that the pain is not changed. Lower back pain and B lumbar pain  Has parkinsons  Which   Notes  Less communicative. Has falls from imbalance      notes numbness down  Right more than left anterolateral  To top of knee      Location: Back and both legs   Location Modifier: entire back  Severity of Pain: 6  Duration of Pain: Intermittent  Frequency of Pain: Intermittent  Aggravating Factors: bending backwards, bending forwards and bending sideways  Limiting Behavior: Standing   Relieving Factors: Tylenol        Previous pain medication trials have included:          Mental health:    Patient feels - secondary to their current pain problems as described above. H/O depression and anxiety: No   Patient is not seeing psychologist orpsychiatrist   Abuse history? No    Employed? No    ANALGESIA:   Are your Current Pain medication (s) helping to decrease pain? No.   Current Pain score: Pain Score:   8    ADVERSE AFFECTS:   Medication Side Effects: No.    ACTIVITY:  Are you able to be more active with your pain medications? No      ABERRANT BEHAVIORS SINCE LAST VISIT? No    Review of Systems   Constitutional: Positive for fatigue. HENT: Negative. Eyes: Negative. Respiratory: Negative. Cardiovascular: Negative. Gastrointestinal: Negative for constipation and nausea. Endocrine: Negative. Genitourinary: Negative for difficulty urinating. Musculoskeletal: Positive for arthralgias, back pain and myalgias. Skin: Negative. Allergic/Immunologic: Negative. Neurological: Positive for weakness and numbness. 11/27/2019    bilateral L2, L3, L4, L5 medial branch blocks    OTHER SURGICAL HISTORY      insert balloon opening for stomach     OTHER SURGICAL HISTORY Bilateral 01/08/2020    Bilateral L2, L3, L4, L5 radiofrequency neurotomies    STOMACH SURGERY      TOTAL HIP ARTHROPLASTY Right     fell broke hip    TOTAL KNEE ARTHROPLASTY Right      Family History   Problem Relation Age of Onset    Arthritis Father      Social History     Socioeconomic History    Marital status:      Spouse name: Not on file    Number of children: Not on file    Years of education: Not on file    Highest education level: Not on file   Occupational History    Not on file   Social Needs    Financial resource strain: Not on file    Food insecurity     Worry: Not on file     Inability: Not on file    Transportation needs     Medical: Not on file     Non-medical: Not on file   Tobacco Use    Smoking status: Never Smoker    Smokeless tobacco: Never Used   Substance and Sexual Activity    Alcohol use: Never     Frequency: Never    Drug use: Never    Sexual activity: Not on file   Lifestyle    Physical activity     Days per week: Not on file     Minutes per session: Not on file    Stress: Not on file   Relationships    Social connections     Talks on phone: Not on file     Gets together: Not on file     Attends Orthodoxy service: Not on file     Active member of club or organization: Not on file     Attends meetings of clubs or organizations: Not on file     Relationship status: Not on file    Intimate partner violence     Fear of current or ex partner: Not on file     Emotionally abused: Not on file     Physically abused: Not on file     Forced sexual activity: Not on file   Other Topics Concern    Not on file   Social History Narrative    Not on file         Family and Social Historyreviewed in the electronic medical record. Imaging:Reviewed available imaging in our system with the patient.   No results found.    Objective:     Physical Exam:  Vitals:    03/25/20 1002   BP: 132/72   Site: Right Upper Arm   Position: Sitting   Cuff Size: Medium Adult   Resp: 16   Weight: 270 lb (122.5 kg)   Height: 6' (1.829 m)     Pain Score:   8    Physical Exam  Constitutional:       Appearance: He is well-developed. Comments: Takes a few seconds to respond verbally  Is appropriate   HENT:      Head: Normocephalic and atraumatic. Cardiovascular:      Pulses: Normal pulses. Comments: Warm extremities. Normal capillary refill. Pulmonary:      Effort: Pulmonary effort is normal.   Skin:     General: Skin is warm and dry. Neurological:      Mental Status: He is alert and oriented to person, place, and time. Cranial Nerves: No cranial nerve deficit. Sensory: No sensory deficit. Motor: No atrophy or abnormal muscle tone. Deep Tendon Reflexes: Reflexes are normal and symmetric. Psychiatric:         Speech: Speech normal.         Behavior: Behavior normal.       Right Ankle Exam     Tenderness   The patient is experiencing tenderness in the ATF. Back Exam     Tenderness   The patient is experiencing tenderness in the lumbar. Range of Motion   Extension: normal   Flexion: normal   Lateral bend right: abnormal   Lateral bend left: abnormal   Rotation right: normal   Rotation left: normal     Muscle Strength   Right Quadriceps:  5/5   Left Quadriceps:  5/5   Right Hamstrings:  5/5   Left Hamstrings:  5/5     Tests   Straight leg raise right: negative  Straight leg raise left: negative    Other   Toe walk: normal  Heel walk: normal  Sensation: normal  Gait: normal     Comments:  Mild kemps   + slump                                      Assessment: This is a 76 y.o. male patient with:    Diagnosis:   Diagnosis Orders   1. Lumbar radiculopathy     2. Lumbosacral spondylosis without myelopathy     3. History of pulmonary embolism     4.  Parkinson's disease ([facility] [address]         Medical Comorbidities:  As listed in the patient's past medical and surgical history    Functional Limitations:   Pain limits function and quality of life. Plan:   B L3-4 TFEs when able  Needs off blood thinner    Meds:   Controlled Substances Monitoring: Pt educated about the risks of taking opiates,including increased sedation, constipation, slowed breathing, tolerance, dependence,and addiction. New Prescriptions    No medications on file      No orders of the defined types were placed in this encounter. No orders of the defined types were placed in this encounter. No follow-ups on file. The patient expressed understanding of the above assessment and plan. Totaltime spent face to face with patient was 25 minutes inwhich  50% or more of the time was spent in counseling, education about risk andbenefits of the above plan, and coordination of care.

## 2020-03-26 ENCOUNTER — TELEPHONE (OUTPATIENT)
Dept: PAIN MANAGEMENT | Age: 75
End: 2020-03-26

## 2020-03-26 NOTE — TELEPHONE ENCOUNTER
Fax from [facility] came and they are having problems with coding the procedure from 12/11/19 due to confusing, and inadequate diagnosis     Please fix, any questions please call [facility] at [phone]

## 2020-03-31 NOTE — TELEPHONE ENCOUNTER
I have added the corresponding diagnoses     To the note which can be printed and faxed     To [facility]     And close this.

## 2020-05-14 LAB
BASOPHILS ABSOLUTE: 0.1 10'3/UL (ref 0–0.2)
BASOPHILS RELATIVE PERCENT: 1.1 % (ref 0.2–2)
EOSINOPHILS ABSOLUTE: 0.6 10'3/UL (ref 0–0.4)
EOSINOPHILS RELATIVE PERCENT: 8.2 % (ref 0–6.3)
HCT VFR BLD CALC: 45.2 % (ref 39.8–49.4)
HEMOGLOBIN: 15 G/DL (ref 13.5–16.8)
LYMPHOCYTES ABSOLUTE: 2.3 10'3/UL (ref 0.4–3.6)
LYMPHOCYTES RELATIVE PERCENT: 33 % (ref 13.3–42.4)
MCH RBC QN AUTO: 30.1 PG (ref 27.9–33.7)
MCHC RBC AUTO-ENTMCNC: 33.2 G/DL (ref 33–35.2)
MCV RBC AUTO: 90.8 FL (ref 83.9–97)
MONOCYTES ABSOLUTE: 0.7 10'3/UL (ref 0.3–1)
MONOCYTES RELATIVE PERCENT: 10.2 % (ref 5.2–13.6)
NEUTROPHILS %: 47.5 % (ref 43.5–74.5)
NEUTROPHILS: 3.4 10'3/UL (ref 2.2–6.3)
PDW BLD-RTO: 13 % (ref 11.7–15.5)
PLATELET # BLD: 169 10'3/UL (ref 144–327)
PMV BLD AUTO: 9.7 FL (ref 7.2–10.4)
RBC # BLD: 4.98 10'6/UL (ref 4.24–5.64)
WBC: 7.1 10'3/UL (ref 4.1–10.2)

## 2020-06-17 LAB
ALBUMIN: 3.6 G/DL (ref 3.2–4.6)
ALP BLD-CCNC: 91 U/L (ref 40–150)
ALT SERPL-CCNC: 9 U/L (ref 0–55)
ANION GAP SERPL CALCULATED.3IONS-SCNC: 11 MEQ/L (ref 5–13)
AST SERPL-CCNC: 22 U/L (ref 5–34)
BASOPHILS ABSOLUTE: 0.1 10'3/UL (ref 0–0.2)
BASOPHILS RELATIVE PERCENT: 0.9 % (ref 0.2–2)
BILIRUB SERPL-MCNC: 0.5 MG/DL (ref 0–1)
BUN BLDV-MCNC: 27 MG/DL (ref 7–18)
CALCIUM SERPL-MCNC: 8.6 MG/DL (ref 8.8–10)
CHLORIDE BLD-SCNC: 109 MMOL/L (ref 98–107)
CO2: 22 MMOL/L (ref 23–31)
CREAT SERPL-MCNC: 1.3 MG/DL (ref 0.72–1.25)
EOSINOPHILS ABSOLUTE: 0.5 10'3/UL (ref 0–0.4)
EOSINOPHILS RELATIVE PERCENT: 6.6 % (ref 0–6.3)
GFR CALCULATED: 57
GLUCOSE: 87 MG/DL (ref 70–99)
HCT VFR BLD CALC: 46.1 % (ref 39.8–49.4)
HEMOGLOBIN: 15.4 G/DL (ref 13.5–16.8)
LYMPHOCYTES ABSOLUTE: 2.2 10'3/UL (ref 0.4–3.6)
LYMPHOCYTES RELATIVE PERCENT: 30.3 % (ref 13.3–42.4)
MCH RBC QN AUTO: 30.6 PG (ref 27.9–33.7)
MCHC RBC AUTO-ENTMCNC: 33.4 G/DL (ref 33–35.2)
MCV RBC AUTO: 91.7 FL (ref 83.9–97)
MONOCYTES ABSOLUTE: 0.9 10'3/UL (ref 0.3–1)
MONOCYTES RELATIVE PERCENT: 11.8 % (ref 5.2–13.6)
NEUTROPHILS %: 50.4 % (ref 43.5–74.5)
NEUTROPHILS: 3.7 10'3/UL (ref 2.2–6.3)
PDW BLD-RTO: 13.5 % (ref 11.7–15.5)
PLATELET # BLD: 172 10'3/UL (ref 144–327)
PMV BLD AUTO: 9.6 FL (ref 7.2–10.4)
POTASSIUM SERPL-SCNC: 4.5 MMOL/L (ref 3.5–5.1)
RBC # BLD: 5.03 10'6/UL (ref 4.24–5.64)
SODIUM BLD-SCNC: 142 MMOL/L (ref 136–145)
TOTAL PROTEIN: 7.1 G/DL (ref 6.4–8.2)
WBC: 7.4 10'3/UL (ref 4.1–10.2)

## 2020-06-24 ENCOUNTER — TELEPHONE (OUTPATIENT)
Dept: PAIN MANAGEMENT | Age: 75
End: 2020-06-24

## 2020-06-24 NOTE — TELEPHONE ENCOUNTER
Spoke with wife regarding injections, she is not sure that [place] wants to do them anymore.   They will discuss it tonight and asked for a phone call back tomorrow to see where things stand for the patient

## 2020-09-04 ENCOUNTER — TELEPHONE (OUTPATIENT)
Dept: PAIN MANAGEMENT | Age: 75
End: 2020-09-04

## 2020-09-04 NOTE — TELEPHONE ENCOUNTER
Dr. Randolph Vines   Phone: [phone]  Fax: [phone]    Is requesting a letter from our office requesting for the patient to go off of zerelto for the epidural procedure. Dr. Gayathri Pandey said he needed to know that before he could do the procedure and the office that Rx's wants a request from us asking.     Please keep oral up to date

## 2020-09-04 NOTE — TELEPHONE ENCOUNTER
Would need  Visit  Before could order Interventional Spine  Surgeries   Can offer sooner appoiintment if desires

## 2020-09-04 NOTE — TELEPHONE ENCOUNTER
Dr. Ruth Tenorio, you have not seen this patient since 3/25/2020 when you suggested Bilat L3 and L4 TFEs. Would it be appropriate for the office to send over a blood thinner hiatus at this time or will the patient need to be seen in the office prior to the request being sent.  He will be following up with you on 9/30/2020

## 2020-09-15 NOTE — TELEPHONE ENCOUNTER
Called Dr. Pastor Wooten office to follow up, Nurse [facility] stated that the request was not received.  Writer confirmed fax and resent request,was informed that the Dr is only at the facility on [address] but should review on 9/18/2020

## 2020-09-24 NOTE — TELEPHONE ENCOUNTER
Called to check status, was informed that they have the letter request and will have the doctor sign it tomorrow 9/25/2020

## 2020-09-25 NOTE — TELEPHONE ENCOUNTER
Received notice, the patient is okay to hold xarelto, note scanned into the media.  The patients wife is aware that he will discuss scheduling procedures on 9/30/2020 at his OV

## 2020-09-30 ENCOUNTER — OFFICE VISIT (OUTPATIENT)
Dept: PAIN MANAGEMENT | Age: 75
End: 2020-09-30
Payer: MEDICARE

## 2020-09-30 VITALS
DIASTOLIC BLOOD PRESSURE: 70 MMHG | BODY MASS INDEX: 36.57 KG/M2 | HEIGHT: 72 IN | WEIGHT: 270 LBS | HEART RATE: 76 BPM | SYSTOLIC BLOOD PRESSURE: 98 MMHG

## 2020-09-30 PROCEDURE — 1036F TOBACCO NON-USER: CPT | Performed by: PHYSICAL MEDICINE & REHABILITATION

## 2020-09-30 PROCEDURE — G8417 CALC BMI ABV UP PARAM F/U: HCPCS | Performed by: PHYSICAL MEDICINE & REHABILITATION

## 2020-09-30 PROCEDURE — 4040F PNEUMOC VAC/ADMIN/RCVD: CPT | Performed by: PHYSICAL MEDICINE & REHABILITATION

## 2020-09-30 PROCEDURE — 3017F COLORECTAL CA SCREEN DOC REV: CPT | Performed by: PHYSICAL MEDICINE & REHABILITATION

## 2020-09-30 PROCEDURE — G8427 DOCREV CUR MEDS BY ELIG CLIN: HCPCS | Performed by: PHYSICAL MEDICINE & REHABILITATION

## 2020-09-30 PROCEDURE — 99214 OFFICE O/P EST MOD 30 MIN: CPT | Performed by: PHYSICAL MEDICINE & REHABILITATION

## 2020-09-30 PROCEDURE — 1123F ACP DISCUSS/DSCN MKR DOCD: CPT | Performed by: PHYSICAL MEDICINE & REHABILITATION

## 2020-09-30 RX ORDER — MEMANTINE HYDROCHLORIDE 28 MG/1
28 CAPSULE, EXTENDED RELEASE ORAL DAILY
COMMUNITY
Start: 2020-09-16

## 2020-09-30 RX ORDER — ACETAMINOPHEN 500 MG
1000 TABLET ORAL EVERY 6 HOURS PRN
COMMUNITY

## 2020-09-30 RX ORDER — OXYBUTYNIN CHLORIDE 15 MG/1
1 TABLET, EXTENDED RELEASE ORAL DAILY
COMMUNITY
Start: 2020-04-13

## 2020-09-30 RX ORDER — RIVAROXABAN 10 MG/1
10 TABLET, FILM COATED ORAL
COMMUNITY

## 2020-09-30 RX ORDER — GALANTAMINE HYDROBROMIDE 24 MG/1
24 CAPSULE, EXTENDED RELEASE ORAL
COMMUNITY
Start: 2020-07-16

## 2020-09-30 ASSESSMENT — ENCOUNTER SYMPTOMS
EYES NEGATIVE: 1
CONSTIPATION: 0
BACK PAIN: 1
RESPIRATORY NEGATIVE: 1
NAUSEA: 0
ALLERGIC/IMMUNOLOGIC NEGATIVE: 1

## 2020-09-30 NOTE — PROGRESS NOTES
PAIN MANAGEMENT FOLLOW-UP NOTE  9/30/20    CHIEF COMPLAINT: This is a74 y.o. male patientwho [facility] the [facility] with a history of Lower Back Pain      PAIN HPI:Toi Cotton returns today for  reevaluation. Since the visit, the patient reports that the pain is improved. No better post Lumbar RFA B   in past no help with  S-I   Injections  Pain across lumbar B   Down  Front of  leg     Location: Back  Location Modifier: entire back  Severity of Pain: 7  Duration of Pain: Intermittent  Frequency of Pain: Intermittent  Aggravating Factors: walking  Limiting Behavior: Standing   Relieving Factors: relaxation        Previous pain medication trials have included:          Mental health:    Patient feels - secondary to their current pain problems as described above. H/O depression and anxiety: No   Patient is not seeing psychologist orpsychiatrist   Abuse history? No    Employed? No    ANALGESIA:   Are your Current Pain medication (s) helping to decrease pain? No.   Current Pain score: Pain Score:   8    ADVERSE AFFECTS:   Medication Side Effects: No.    ACTIVITY:  Are you able to be more active with your pain medications? No      ABERRANT BEHAVIORS SINCE LAST VISIT? No    Review of Systems   Constitutional: Positive for fatigue. HENT: Negative. Eyes: Negative. Respiratory: Negative. Cardiovascular: Negative. Gastrointestinal: Negative for constipation and nausea. Endocrine: Negative. Genitourinary: Negative for difficulty urinating. Musculoskeletal: Positive for arthralgias, back pain and myalgias. Skin: Negative. Allergic/Immunologic: Negative. Neurological: Positive for weakness and numbness. Hematological: Negative. Psychiatric/Behavioral: Positive for sleep disturbance. All other systems reviewed and are negative.        No Known Allergies    Outpatient Medications Prior to Visit   Medication Sig Dispense Refill    acetaminophen (TYLENOL) 500 MG tablet Take 1,000 mg by mouth every 6 hours as needed for Pain      galantamine (RAZADYNE ER) 24 MG extended release capsule Take 24 mg by mouth daily (with breakfast)      memantine ER (NAMENDA XR) 28 MG CP24 extended release capsule Take 28 mg by mouth daily      oxybutynin (DITROPAN XL) 15 MG extended release tablet Take 1 tablet by mouth daily      rivaroxaban (XARELTO) 10 MG TABS tablet Take 10 mg by mouth      carbidopa-levodopa (SINEMET)  MG per tablet Take 2 tablets by mouth 3 times daily      citalopram (CELEXA) 20 MG tablet Take 20 mg by mouth daily      omeprazole (PRILOSEC) 40 MG delayed release capsule Take 1 capsule by mouth daily      risperiDONE (RISPERDAL) 1 MG tablet Take by mouth 2 times daily 0.5 mg every AM, 1 mg every evening      cloZAPine (CLOZARIL) 25 MG tablet Take by mouth 1/2 tab bid and 1 tab night      docusate sodium (COLACE) 100 MG capsule Colace 100 mg capsule   Take 1 capsule twice a day by oral route.  tamsulosin (FLOMAX) 0.4 MG capsule Take 0.4 mg by mouth daily      rivaroxaban (XARELTO) 20 MG TABS tablet Take 20 mg by mouth      galantamine (RAZADYNE ER) 16 MG extended release capsule Take 16 mg by mouth daily      Acetaminophen (TYLENOL) 325 MG CAPS Take by mouth as needed      betamethasone valerate (VALISONE) 0.1 % cream betamethasone valerate 0.1 % topical cream   APPLY A THIN LAYER TO THE AFFECTED AREA on legs twice DAILY      oxybutynin (DITROPAN-XL) 10 MG extended release tablet Take 10 mg by mouth daily       No facility-administered medications prior to visit.         Past Medical History:   Diagnosis Date    Arthritis     DVT (deep venous thrombosis) ([facility] [address] 2015    hx of DVT in right leg and lung    Fall     falls due to parkensin    HTN (hypertension)     Osteoporosis     Parkinson's disease ([facility] [address]        Past Surgical History:   Procedure Laterality Date    BACK SURGERY      FACET JOINT INJECTION Bilateral 11/06/2019    Bl3-4 4-5 5-S1 facet joint injections with arthrography.  FACET JOINT INJECTION Bilateral 02/05/2020    L1-L2, L2-L3    NERVE BLOCK Bilateral 12/11/2019    bilateral L2, L3, L4, L5 medial branch blocks.  NERVE BLOCK Bilateral 11/27/2019    bilateral L2, L3, L4, L5 medial branch blocks    OTHER SURGICAL HISTORY      insert balloon opening for stomach     OTHER SURGICAL HISTORY Bilateral 01/08/2020    Bilateral L2, L3, L4, L5 radiofrequency neurotomies    STOMACH SURGERY      TOTAL HIP ARTHROPLASTY Right     fell broke hip    TOTAL KNEE ARTHROPLASTY Right      Family History   Problem Relation Age of Onset    Arthritis Father      Social History     Socioeconomic History    Marital status:      Spouse name: None    Number of children: None    Years of education: None    Highest education level: None   Occupational History    None   Social Needs    Financial resource strain: None    Food insecurity     Worry: None     Inability: None    Transportation needs     Medical: None     Non-medical: None   Tobacco Use    Smoking status: Never Smoker    Smokeless tobacco: Never Used   Substance and Sexual Activity    Alcohol use: Never     Frequency: Never    Drug use: Never    Sexual activity: None   Lifestyle    Physical activity     Days per week: None     Minutes per session: None    Stress: None   Relationships    Social connections     Talks on phone: None     Gets together: None     Attends Baptism service: None     Active member of club or organization: None     Attends meetings of clubs or organizations: None     Relationship status: None    Intimate partner violence     Fear of current or ex partner: None     Emotionally abused: None     Physically abused: None     Forced sexual activity: None   Other Topics Concern    None   Social History Narrative    None         Family and Social Historyreviewed in the electronic medical record.     Imaging:Reviewed available imaging in our system with the patient. No results found. Objective:     Physical Exam:  Vitals:    09/30/20 1203   BP: 98/70   Site: Left Upper Arm   Position: Sitting   Pulse: 76   Weight: 270 lb (122.5 kg)   Height: 6' (1.829 m)     Pain Score:   8    Physical Exam  Vitals signs and nursing note reviewed. Constitutional:       Appearance: He is well-developed. HENT:      Head: Normocephalic and atraumatic. Cardiovascular:      Pulses: Normal pulses. Comments: Warm extremities. Normal capillary refill. Pulmonary:      Effort: Pulmonary effort is normal.      Breath sounds: Normal breath sounds. Abdominal:      General: Abdomen is flat. Palpations: Abdomen is soft. Skin:     General: Skin is warm and dry. Neurological:      Mental Status: He is alert and oriented to person, place, and time. Mental status is at baseline. Cranial Nerves: No cranial nerve deficit. Sensory: No sensory deficit. Motor: No atrophy or abnormal muscle tone. Deep Tendon Reflexes: Reflexes are normal and symmetric. Psychiatric:         Mood and Affect: Mood normal.         Speech: Speech normal.         Behavior: Behavior normal.       Back Exam     Tenderness   The patient is experiencing tenderness in the lumbar. Range of Motion   Extension: normal   Flexion: normal   Lateral bend right: normal   Lateral bend left: normal   Rotation right: normal   Rotation left: normal     Muscle Strength   Right Quadriceps:  5/5   Left Quadriceps:  5/5   Right Hamstrings:  5/5   Left Hamstrings:  5/5     Tests   Straight leg raise right: positive  Straight leg raise left: positive    Other   Toe walk: normal  Heel walk: normal  Sensation: normal  Gait: normal   Back redness:  kyphosis with walking     Comments:  +Kemps  +Fabers   Kyphosis with walking                                      Assessment: This is a 76 y.o. male patient with:    Diagnosis:   Diagnosis Orders   1. Lumbar radiculopathy     2.  Lumbosacral spondylosis without myelopathy     3. Parkinson's disease ([facility] [address]     4. Lumbar facet joint syndrome     5. Sacroiliitis ([facility] [address]     6. History of pediculosis         Medical Comorbidities:  As listed in the patient's past medical and surgical history    Functional Limitations:   Pain limits function and quality of life. Plan:   No better with S_I  Injection nor RFA recently    Dr. Machuca Counter office cleared off Xarelto  So will proceed   B L3, 4 TFE     Lumbar MRI  Mentioned  10/19 abdominal  aorta width 3.1  Not sure if  PCP knows    Should notify  Will send this letter     Meds:   Controlled Substances Monitoring: Pt educated about the risks of taking opiates,including increased sedation, constipation, slowed breathing, tolerance, dependence,and addiction. New Prescriptions    No medications on file      No orders of the defined types were placed in this encounter. No orders of the defined types were placed in this encounter. No follow-ups on file. The patient expressed understanding of the above assessment and plan. Totaltime spent face to face with patient was 25 minutes inwhich  50% or more of the time was spent in counseling, education about risk andbenefits of the above plan, and coordination of care.

## 2020-11-04 ENCOUNTER — PROCEDURE VISIT (OUTPATIENT)
Dept: PAIN MANAGEMENT | Age: 75
End: 2020-11-04
Payer: MEDICARE

## 2020-11-04 PROCEDURE — 64483 NJX AA&/STRD TFRM EPI L/S 1: CPT | Performed by: PHYSICAL MEDICINE & REHABILITATION

## 2020-11-04 PROCEDURE — 64484 NJX AA&/STRD TFRM EPI L/S EA: CPT | Performed by: PHYSICAL MEDICINE & REHABILITATION

## 2020-11-04 NOTE — PROGRESS NOTES
TRANSFORAMINAL EPIDURAL STEROID INJECTION    11/4/20    Surgeon: Barrington Parrish MD    Pre-operative Diagnosis:   Patient Active Problem List   Diagnosis Code    HTN (hypertension) I10    Osteoporosis M81.0    Parkinson's disease ([facility] [address] G20    Arthritis M19.90   lumbar radiculopathy  Lumbar spondylosis    Post-operative Diagnosis: Same    Assistants: none    This is a 76 y.o. male patient with pain in the Back, left leg, right leg. Previous treatment and examination findings are noted in the H&P.BL2,L3 transforaminal epidural injection has been requested for diagnostic and therapeutic reasons. Conservative treatment was ineffective i.e.: ice, NSAIDS, rest, narcotic medication, chiropractic care, physical therapy and message therapy. Patient is unable to perform the following ADL's: ambulating and grooming                         Last Plain films: 2020      EXAMINATION:  1. BL2,L3 transforaminal radiculogram/epidurogram.   2. BL2,3 transforaminal epidural anesthetic injection. 3. BL2,3 transforaminal epidural steroid injection. CONSENT: Written consent was obtained from the patient on preprinted consent form after explaining the procedure, indications, potential complications and outcomes. Alternative treatments were also discussed. DISCUSSION: The patient was sterilely prepped and draped in the usual fashion in the prone position. Time out was verified for correct patient, side, level and procedure. SEDATION:   No conscious sedation was performed during the procedure. The patient remained awake and conversed throughout the procedure. The patient underwent pulse oximetry and blood pressure monitoring independently by a trained observer, as well as by a physician. PROCEDURE:  Under image-intensifier control, a 22 gauge needle x 5 inch spinal needle was guided successfully into the epidural space employing a posterior lateral/oblique approach.  Needle aspiration was negative for heme or none    IMPRESSIONS:  1. BL2,3 transforaminal epidurogram, epidural anesthesia and epidural steroid injection procedures accomplished without incident. RECOMMENDATIONS:  1. Complete and return Post-Procedure Pain and Activity Diary.   2. Contact the [address] for symptom exacerbation, fever or unusual symptoms. 3. Post-procedure care according to verbal and written discharge instructions    POST-PROCEDURE EPIDUROGRAPHY INTERPRETATION:    EXAMINATION: AP, lateral, and oblique views    FLUORO TIME: 37 seconds    DISCUSSION: Spot views of the spine reveal normal alignment and segmentation. Spinal needle is positioned at the BL2,3 neuroforamin. Contrast spreads and outlines the BL2,3 nerve/ neuroforamin and epidural space. The epidurogram reveals excellent contrast flow. Visualized spine reveals See radiology report. Soft tissues reveal no abnormalities. IMPRESSION: BL2,3 transforaminal epidurogram/epineurogram reveals satisfactory needle position and contrast spread.      Electronically signed by Barrington Parrish MD on 11/4/2020 at 11:00 AM